# Patient Record
Sex: FEMALE | Race: BLACK OR AFRICAN AMERICAN | NOT HISPANIC OR LATINO | Employment: FULL TIME | URBAN - METROPOLITAN AREA
[De-identification: names, ages, dates, MRNs, and addresses within clinical notes are randomized per-mention and may not be internally consistent; named-entity substitution may affect disease eponyms.]

---

## 2020-03-28 ENCOUNTER — HOSPITAL ENCOUNTER (EMERGENCY)
Facility: HOSPITAL | Age: 50
Discharge: HOME | End: 2020-03-28
Attending: EMERGENCY MEDICINE | Admitting: EMERGENCY MEDICINE
Payer: COMMERCIAL

## 2020-03-28 VITALS
HEART RATE: 100 BPM | TEMPERATURE: 98 F | WEIGHT: 210.1 LBS | SYSTOLIC BLOOD PRESSURE: 184 MMHG | OXYGEN SATURATION: 98 % | RESPIRATION RATE: 18 BRPM | DIASTOLIC BLOOD PRESSURE: 86 MMHG

## 2020-03-28 DIAGNOSIS — J06.9 URI (UPPER RESPIRATORY INFECTION): Primary | ICD-10-CM

## 2020-03-28 PROCEDURE — 99283 EMERGENCY DEPT VISIT LOW MDM: CPT | Performed by: EMERGENCY MEDICINE

## 2020-03-28 PROCEDURE — 99284 EMERGENCY DEPT VISIT MOD MDM: CPT

## 2020-03-28 PROCEDURE — 87635 SARS-COV-2 COVID-19 AMP PRB: CPT | Performed by: EMERGENCY MEDICINE

## 2020-04-02 LAB — SARS-COV-2 RNA SPEC QL NAA+PROBE: NOT DETECTED

## 2020-05-03 ENCOUNTER — APPOINTMENT (EMERGENCY)
Dept: RADIOLOGY | Facility: HOSPITAL | Age: 50
End: 2020-05-03
Payer: COMMERCIAL

## 2020-05-03 ENCOUNTER — HOSPITAL ENCOUNTER (EMERGENCY)
Facility: HOSPITAL | Age: 50
Discharge: HOME | End: 2020-05-03
Attending: EMERGENCY MEDICINE | Admitting: EMERGENCY MEDICINE
Payer: COMMERCIAL

## 2020-05-03 VITALS
SYSTOLIC BLOOD PRESSURE: 182 MMHG | RESPIRATION RATE: 18 BRPM | WEIGHT: 196.87 LBS | HEART RATE: 98 BPM | OXYGEN SATURATION: 95 % | TEMPERATURE: 99.9 F | HEIGHT: 66 IN | DIASTOLIC BLOOD PRESSURE: 89 MMHG | BODY MASS INDEX: 31.64 KG/M2

## 2020-05-03 DIAGNOSIS — J06.9 URI (UPPER RESPIRATORY INFECTION): ICD-10-CM

## 2020-05-03 DIAGNOSIS — U07.1 COVID-19: Primary | ICD-10-CM

## 2020-05-03 LAB — SARS-COV-2 RNA RESP QL NAA+PROBE: POSITIVE

## 2020-05-03 PROCEDURE — 71045 X-RAY EXAM CHEST 1 VIEW: CPT

## 2020-05-03 PROCEDURE — 99283 EMERGENCY DEPT VISIT LOW MDM: CPT | Performed by: EMERGENCY MEDICINE

## 2020-05-03 PROCEDURE — 87635 SARS-COV-2 COVID-19 AMP PRB: CPT | Performed by: EMERGENCY MEDICINE

## 2020-05-03 PROCEDURE — 99283 EMERGENCY DEPT VISIT LOW MDM: CPT

## 2020-05-03 RX ORDER — AZITHROMYCIN 250 MG/1
TABLET, FILM COATED ORAL
Qty: 6 TABLET | Refills: 0 | Status: SHIPPED | OUTPATIENT
Start: 2020-05-03 | End: 2020-05-07

## 2020-09-06 ENCOUNTER — HOSPITAL ENCOUNTER (EMERGENCY)
Facility: HOSPITAL | Age: 50
Discharge: HOME | End: 2020-09-06
Attending: EMERGENCY MEDICINE | Admitting: EMERGENCY MEDICINE
Payer: COMMERCIAL

## 2020-09-06 VITALS
HEIGHT: 70 IN | BODY MASS INDEX: 28.5 KG/M2 | HEART RATE: 71 BPM | DIASTOLIC BLOOD PRESSURE: 71 MMHG | SYSTOLIC BLOOD PRESSURE: 172 MMHG | TEMPERATURE: 97.7 F | RESPIRATION RATE: 18 BRPM | OXYGEN SATURATION: 98 % | WEIGHT: 199.08 LBS

## 2020-09-06 DIAGNOSIS — H40.9 GLAUCOMA: Primary | ICD-10-CM

## 2020-09-06 PROCEDURE — 99283 EMERGENCY DEPT VISIT LOW MDM: CPT

## 2020-09-06 PROCEDURE — 99284 EMERGENCY DEPT VISIT MOD MDM: CPT | Performed by: EMERGENCY MEDICINE

## 2020-09-06 RX ORDER — LATANOPROST 50 UG/ML
1 SOLUTION/ DROPS OPHTHALMIC ONCE
Status: DISCONTINUED | OUTPATIENT
Start: 2020-09-06 | End: 2020-09-06

## 2020-09-06 RX ORDER — ACETAZOLAMIDE 250 MG/1
500 TABLET ORAL ONCE
Status: DISCONTINUED | OUTPATIENT
Start: 2020-09-06 | End: 2020-09-06

## 2020-09-06 RX ORDER — AMLODIPINE BESYLATE 5 MG/1
5 TABLET ORAL DAILY
COMMUNITY
Start: 2020-09-03

## 2020-09-06 RX ORDER — TETRACAINE HYDROCHLORIDE 5 MG/ML
1 SOLUTION OPHTHALMIC ONCE
Status: COMPLETED | OUTPATIENT
Start: 2020-09-06 | End: 2020-09-06

## 2020-09-06 RX ORDER — ALPRAZOLAM 0.25 MG/1
0.25 TABLET ORAL DAILY PRN
COMMUNITY
Start: 2020-09-03 | End: 2021-03-02

## 2020-09-06 RX ORDER — TIMOLOL MALEATE 5 MG/ML
1 SOLUTION/ DROPS OPHTHALMIC 2 TIMES DAILY
Status: DISCONTINUED | OUTPATIENT
Start: 2020-09-06 | End: 2020-09-06

## 2020-09-06 RX ORDER — ALBUTEROL SULFATE 90 UG/1
2 AEROSOL, METERED RESPIRATORY (INHALATION) EVERY 6 HOURS PRN
COMMUNITY
Start: 2020-05-28 | End: 2021-05-28

## 2020-09-06 RX ADMIN — FLUORESCEIN SODIUM 1 STRIP: 1 STRIP OPHTHALMIC at 09:57

## 2020-09-06 RX ADMIN — TETRACAINE HYDROCHLORIDE 1 DROP: 5 SOLUTION OPHTHALMIC at 09:56

## 2020-09-06 NOTE — ED PROVIDER NOTES
History  Chief Complaint   Patient presents with    Eye Problem     Patient notes right-sided eye pain and blurry vision since last Friday  States pain started after increased stress and thought BP was elevated  Went to PMD on Thursday and wa told might have ruptured blood vessels in the back of her eye  Patient notes right eye pain, blurry viison, and "seeing spots "     51 yo female with h/o HTN who presents to the ED for evaluation of approximately 1 week of gradual onset of R eye pain with associated blurry vision  Symptoms constant  Worse with light  Pt notes h/o similar but less severe sxs multiple times in the past with uncertain dx  No fever  No HA  Medical history significant for HTN, was started on amlodipine earlier this week for same  Prior to Admission Medications   Prescriptions Last Dose Informant Patient Reported? Taking? ALPRAZolam (XANAX) 0 25 mg tablet 2020 at 2100  Yes Yes   Sig: Take 0 25 mg by mouth daily as needed   albuterol (PROVENTIL HFA,VENTOLIN HFA) 90 mcg/act inhaler More than a month at Unknown time  Yes No   Sig: Inhale 2 puffs every 6 (six) hours as needed   amLODIPine (NORVASC) 5 mg tablet 2020 at 0900  Yes Yes   Sig: Take 5 mg by mouth daily      Facility-Administered Medications: None       Past Medical History:   Diagnosis Date    Anxiety        Past Surgical History:   Procedure Laterality Date     SECTION      HYSTERECTOMY         History reviewed  No pertinent family history  I have reviewed and agree with the history as documented  E-Cigarette/Vaping    E-Cigarette Use Never User      E-Cigarette/Vaping Substances     Social History     Tobacco Use    Smoking status: Never Smoker    Smokeless tobacco: Never Used   Substance Use Topics    Alcohol use: Never     Frequency: Never    Drug use: Never       Review of Systems   Constitutional: Negative for chills and fever     Eyes: Positive for photophobia, pain, redness and visual disturbance  Negative for discharge and itching  Neurological: Negative for dizziness and headaches  All other systems reviewed and are negative  Physical Exam  Physical Exam  Vitals signs and nursing note reviewed  Constitutional:       General: She is not in acute distress  Appearance: She is well-developed  She is not diaphoretic  HENT:      Head: Normocephalic and atraumatic  Eyes:      General:         Right eye: No discharge  Left eye: No discharge  Comments: OD severe conjunctival injection  Pupil poorly reactive  Due to difficulty with exam and pt being unable to maintain her gaze and keep eyes open it is difficult to tell if there is an APD  There is no proptosis or chemosis  The anterior chamber appears cloudy  OS - normal to inspection  Neck:      Musculoskeletal: Normal range of motion and neck supple  Vascular: No JVD  Pulmonary:      Breath sounds: No stridor  Musculoskeletal: Normal range of motion  General: No tenderness or deformity  Skin:     General: Skin is warm and dry  Capillary Refill: Capillary refill takes less than 2 seconds  Neurological:      Mental Status: She is alert and oriented to person, place, and time  Cranial Nerves: No cranial nerve deficit  Sensory: No sensory deficit  Motor: No abnormal muscle tone        Coordination: Coordination normal          Vital Signs  ED Triage Vitals [09/06/20 0916]   Temperature Pulse Respirations Blood Pressure SpO2   97 7 °F (36 5 °C) 70 18 (!) 185/92 98 %      Temp Source Heart Rate Source Patient Position - Orthostatic VS BP Location FiO2 (%)   Oral Monitor Lying Right arm --      Pain Score       --           Vitals:    09/06/20 0916 09/06/20 0923 09/06/20 0930   BP: (!) 185/92 (!) 208/90 (!) 172/71   Pulse: 70 72 71   Patient Position - Orthostatic VS: Lying Lying Lying         Visual Acuity  Visual Acuity      Most Recent Value   Visual acuity R eye is  20/70 Visual acuity Left eye is  20/50   Wearing corrective eyewear/lenses? No   No corrective eyewear/lenses  Yes   L Pupil Size (mm)  2   R Pupil Size (mm)  2          ED Medications  Medications   fluorescein sodium sterile ophthalmic strip 1 strip (1 strip Both Eyes Given 9/6/20 0957)   tetracaine 0 5 % ophthalmic solution 1 drop (1 drop Both Eyes Given 9/6/20 0956)       Diagnostic Studies  Results Reviewed     None                 No orders to display              Procedures  Procedures         ED Course       US AUDIT      Most Recent Value   Initial Alcohol Screen: US AUDIT-C    1  How often do you have a drink containing alcohol?  0 Filed at: 09/06/2020 0917   2  How many drinks containing alcohol do you have on a typical day you are drinking? 0 Filed at: 09/06/2020 0917   3b  FEMALE Any Age, or MALE 65+: How often do you have 4 or more drinks on one occassion? 0 Filed at: 09/06/2020 0917   Audit-C Score  0 Filed at: 09/06/2020 5543                  TON/DAST-10      Most Recent Value   How many times in the past year have you    Used an illegal drug or used a prescription medication for non-medical reasons? Never Filed at: 09/06/2020 8353                                MDM  Number of Diagnoses or Management Options  Glaucoma:   Diagnosis management comments: R eye erythema with decreased acuity and pain, suspect glaucoma  Discussed case w Dr Cherelle Ghosh who will see pt now in his office  Pt's  will drive her to office  Disposition  Final diagnoses:   Glaucoma     Time reflects when diagnosis was documented in both MDM as applicable and the Disposition within this note     Time User Action Codes Description Comment    9/6/2020 10:31 AM Feng Newell Add [H40 9] Glaucoma       ED Disposition     ED Disposition Condition Date/Time Comment    Discharge Stable Sun Sep 6, 2020 10:31 AM Seth Blackwell discharge to home/self care              Follow-up Information     Follow up With Specialties Details Why Contact Info    Jerri Fernandez MD Ophthalmology  go see him TODAY, he will see you at 12 noon today  [place] [address]  [phone]            Discharge Medication List as of 9/6/2020 10:33 AM      CONTINUE these medications which have NOT CHANGED    Details   ALPRAZolam (XANAX) 0 25 mg tablet Take 0 25 mg by mouth daily as needed, Starting Thu 9/3/2020, Until Tue 3/2/2021, Historical Med      amLODIPine (NORVASC) 5 mg tablet Take 5 mg by mouth daily, Starting Thu 9/3/2020, Historical Med      albuterol (PROVENTIL HFA,VENTOLIN HFA) 90 mcg/act inhaler Inhale 2 puffs every 6 (six) hours as needed, Starting Thu 5/28/2020, Until Fri 5/28/2021, Historical Med           No discharge procedures on file      PDMP Review     None          ED Provider  Electronically Signed by           Margaux Myles MD  09/06/20 7479

## 2020-09-06 NOTE — DISCHARGE INSTRUCTIONS
Go to see Dr Urbina Or at his office at [place] [address] in [place]  The office is on the 2nd floor of the [facility]  Take the elevator to the second floor  The door will be locked unless Dr Urbina Or is already there  If he is not there you should wait for him  If you get lost call him, his cell phone number is [phone] or call me back at [phone] and I will try to help you

## 2021-09-01 ENCOUNTER — HOSPITAL ENCOUNTER (EMERGENCY)
Facility: HOSPITAL | Age: 51
Discharge: HOME | End: 2021-09-02
Attending: EMERGENCY MEDICINE | Admitting: EMERGENCY MEDICINE
Payer: COMMERCIAL

## 2021-09-01 ENCOUNTER — APPOINTMENT (EMERGENCY)
Dept: RADIOLOGY | Facility: HOSPITAL | Age: 51
End: 2021-09-01
Payer: COMMERCIAL

## 2021-09-01 ENCOUNTER — APPOINTMENT (EMERGENCY)
Dept: CT IMAGING | Facility: HOSPITAL | Age: 51
End: 2021-09-01
Payer: COMMERCIAL

## 2021-09-01 DIAGNOSIS — S89.90XA KNEE INJURY: Primary | ICD-10-CM

## 2021-09-01 LAB
ALBUMIN SERPL BCP-MCNC: 3.4 G/DL (ref 3.5–5)
ALP SERPL-CCNC: 86 U/L (ref 46–116)
ALT SERPL W P-5'-P-CCNC: 23 U/L (ref 12–78)
ANION GAP SERPL CALCULATED.3IONS-SCNC: 9 MMOL/L (ref 4–13)
AST SERPL W P-5'-P-CCNC: 14 U/L (ref 5–45)
BILIRUB SERPL-MCNC: 0.42 MG/DL (ref 0.2–1)
BUN SERPL-MCNC: 13 MG/DL (ref 5–25)
CALCIUM ALBUM COR SERPL-MCNC: 9.2 MG/DL (ref 8.3–10.1)
CALCIUM SERPL-MCNC: 8.7 MG/DL (ref 8.3–10.1)
CHLORIDE SERPL-SCNC: 103 MMOL/L (ref 100–108)
CO2 SERPL-SCNC: 30 MMOL/L (ref 21–32)
CREAT SERPL-MCNC: 0.77 MG/DL (ref 0.6–1.3)
GFR SERPL CREATININE-BSD FRML MDRD: 104 ML/MIN/1.73SQ M
GLUCOSE SERPL-MCNC: 101 MG/DL (ref 65–140)
POTASSIUM SERPL-SCNC: 3.5 MMOL/L (ref 3.5–5.3)
PROT SERPL-MCNC: 6.9 G/DL (ref 6.4–8.2)
SODIUM SERPL-SCNC: 142 MMOL/L (ref 136–145)

## 2021-09-01 PROCEDURE — 73706 CT ANGIO LWR EXTR W/O&W/DYE: CPT

## 2021-09-01 PROCEDURE — 73600 X-RAY EXAM OF ANKLE: CPT

## 2021-09-01 PROCEDURE — G1004 CDSM NDSC: HCPCS

## 2021-09-01 PROCEDURE — 36415 COLL VENOUS BLD VENIPUNCTURE: CPT | Performed by: EMERGENCY MEDICINE

## 2021-09-01 PROCEDURE — 96376 TX/PRO/DX INJ SAME DRUG ADON: CPT

## 2021-09-01 PROCEDURE — 96374 THER/PROPH/DIAG INJ IV PUSH: CPT

## 2021-09-01 PROCEDURE — 99284 EMERGENCY DEPT VISIT MOD MDM: CPT

## 2021-09-01 PROCEDURE — 80053 COMPREHEN METABOLIC PANEL: CPT | Performed by: EMERGENCY MEDICINE

## 2021-09-01 PROCEDURE — 73564 X-RAY EXAM KNEE 4 OR MORE: CPT

## 2021-09-01 PROCEDURE — 99284 EMERGENCY DEPT VISIT MOD MDM: CPT | Performed by: EMERGENCY MEDICINE

## 2021-09-01 RX ORDER — KETOROLAC TROMETHAMINE 30 MG/ML
15 INJECTION, SOLUTION INTRAMUSCULAR; INTRAVENOUS ONCE
Status: COMPLETED | OUTPATIENT
Start: 2021-09-01 | End: 2021-09-01

## 2021-09-01 RX ORDER — ACETAMINOPHEN 325 MG/1
975 TABLET ORAL ONCE
Status: COMPLETED | OUTPATIENT
Start: 2021-09-01 | End: 2021-09-01

## 2021-09-01 RX ORDER — OXYCODONE HYDROCHLORIDE 5 MG/1
5 TABLET ORAL ONCE
Status: COMPLETED | OUTPATIENT
Start: 2021-09-01 | End: 2021-09-01

## 2021-09-01 RX ADMIN — OXYCODONE HYDROCHLORIDE 5 MG: 5 TABLET ORAL at 23:57

## 2021-09-01 RX ADMIN — KETOROLAC TROMETHAMINE 15 MG: 30 INJECTION, SOLUTION INTRAMUSCULAR; INTRAVENOUS at 21:04

## 2021-09-01 RX ADMIN — IOHEXOL 190 ML: 350 INJECTION, SOLUTION INTRAVENOUS at 22:10

## 2021-09-01 RX ADMIN — KETOROLAC TROMETHAMINE 15 MG: 30 INJECTION, SOLUTION INTRAMUSCULAR; INTRAVENOUS at 23:43

## 2021-09-01 RX ADMIN — ACETAMINOPHEN 975 MG: 325 TABLET, FILM COATED ORAL at 21:06

## 2021-09-01 NOTE — Clinical Note
Seda Willis was seen and treated in our emergency department on 9/1/2021  Diagnosis:     Edison Felton  may return to work on return date  She may return on this date: 09/06/2021         If you have any questions or concerns, please don't hesitate to call        Saint Snipe, DO    ______________________________           _______________          _______________  Hospital Representative                              Date                                Time

## 2021-09-02 VITALS
RESPIRATION RATE: 20 BRPM | TEMPERATURE: 98.1 F | WEIGHT: 200 LBS | SYSTOLIC BLOOD PRESSURE: 150 MMHG | OXYGEN SATURATION: 99 % | BODY MASS INDEX: 28.7 KG/M2 | HEART RATE: 79 BPM | DIASTOLIC BLOOD PRESSURE: 72 MMHG

## 2021-09-02 NOTE — ED NOTES
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

## 2021-09-02 NOTE — ED PROVIDER NOTES
History  Chief Complaint   Patient presents with    Knee Pain     Patient reports that she stood up on the bus and felt "my bones in my knee move in and out"     Patient is a 41-year-old female past medical history of anxiety and hypertension presenting with left knee pain  Patient states that 4 hours ago while standing on the bus she felt her knee give out, states that her knee went backwards and then went back into place  She states that since that time she has been unable to bear weight notes constant aching/burning pain which radiates up and down her leg and is worse with any attempts to bear weight  Has not taken any pain medication denies any numbness or tingling  Denies any other injuries and states that she fell into a chair and denies head trauma  Prior to Admission Medications   Prescriptions Last Dose Informant Patient Reported? Taking? ALPRAZolam (XANAX) 0 25 mg tablet   Yes No   Sig: Take 0 25 mg by mouth daily as needed   amLODIPine (NORVASC) 5 mg tablet   Yes No   Sig: Take 5 mg by mouth daily      Facility-Administered Medications: None       Past Medical History:   Diagnosis Date    Anxiety        Past Surgical History:   Procedure Laterality Date     SECTION      HYSTERECTOMY         History reviewed  No pertinent family history  I have reviewed and agree with the history as documented  E-Cigarette/Vaping    E-Cigarette Use Never User      E-Cigarette/Vaping Substances     Social History     Tobacco Use    Smoking status: Never Smoker    Smokeless tobacco: Never Used   Vaping Use    Vaping Use: Never used   Substance Use Topics    Alcohol use: Never    Drug use: Never       Review of Systems   All other systems reviewed and are negative  Physical Exam  Physical Exam  Vitals reviewed  Constitutional:       General: She is not in acute distress  Appearance: Normal appearance  She is not ill-appearing     HENT:      Mouth/Throat:      Mouth: Mucous membranes are moist    Eyes:      Conjunctiva/sclera: Conjunctivae normal    Cardiovascular:      Rate and Rhythm: Normal rate  Pulses: Normal pulses  Pulmonary:      Effort: Pulmonary effort is normal    Abdominal:      General: Abdomen is flat  Tenderness: There is no abdominal tenderness  Musculoskeletal:         General: Swelling and tenderness present  Cervical back: Neck supple  Comments: Lateral and posterior tenderness, no patellar tenderness, tenderness to the lateral malleolus with mild edema, intact distal motor, sensation, pulses, mildly reduced range of motion flexion extension the knee secondary to pain   Skin:     General: Skin is warm and dry  Capillary Refill: Capillary refill takes less than 2 seconds  Neurological:      General: No focal deficit present  Mental Status: She is alert  Sensory: No sensory deficit  Motor: Weakness present     Psychiatric:         Mood and Affect: Mood normal          Vital Signs  ED Triage Vitals [09/01/21 2027]   Temperature Pulse Respirations Blood Pressure SpO2   98 1 °F (36 7 °C) 78 16 168/81 98 %      Temp Source Heart Rate Source Patient Position - Orthostatic VS BP Location FiO2 (%)   Oral Monitor -- Right arm --      Pain Score       8           Vitals:    09/01/21 2027   BP: 168/81   Pulse: 78         Visual Acuity      ED Medications  Medications   ketorolac (TORADOL) injection 15 mg (15 mg Intravenous Given 9/1/21 2104)   acetaminophen (TYLENOL) tablet 975 mg (975 mg Oral Given 9/1/21 2106)   iohexol (OMNIPAQUE) 350 MG/ML injection (SINGLE-DOSE) 190 mL (190 mL Intravenous Given 9/1/21 2210)       Diagnostic Studies  Results Reviewed     Procedure Component Value Units Date/Time    Comprehensive metabolic panel [[id number]]  (Abnormal) Collected: 09/01/21 2103    Lab Status: Final result Specimen: Blood from Arm, Left Updated: 09/01/21 2127     Sodium 142 mmol/L      Potassium 3 5 mmol/L      Chloride 103 mmol/L CO2 30 mmol/L      ANION GAP 9 mmol/L      BUN 13 mg/dL      Creatinine 0 77 mg/dL      Glucose 101 mg/dL      Calcium 8 7 mg/dL      Corrected Calcium 9 2 mg/dL      AST 14 U/L      ALT 23 U/L      Alkaline Phosphatase 86 U/L      Total Protein 6 9 g/dL      Albumin 3 4 g/dL      Total Bilirubin 0 42 mg/dL      eGFR 104 ml/min/1 73sq m     Narrative:      [place] guidelines for Chronic Kidney Disease (CKD):     Stage 1 with normal or high GFR (GFR > 90 mL/min/1 73 square meters)    Stage 2 Mild CKD (GFR = 60-89 mL/min/1 73 square meters)    Stage 3A Moderate CKD (GFR = 45-59 mL/min/1 73 square meters)    Stage 3B Moderate CKD (GFR = 30-44 mL/min/1 73 square meters)    Stage 4 Severe CKD (GFR = 15-29 mL/min/1 73 square meters)    Stage 5 End Stage CKD (GFR <15 mL/min/1 73 square meters)  Note: GFR calculation is accurate only with a steady state creatinine                 CTA lower extremity left w wo contrast   Final Result by Noemi Shannon MD (09/01 2312)      Unremarkable CT angiogram of the lower extremities  Workstation performed: [id number]         XR ankle 2 views LEFT   ED Interpretation by Stephane Boateng DO (09/01 2108)   NAD      XR knee 4+ vw left injury   ED Interpretation by Stephane Boateng DO (09/01 2108)   Possible tibial plateau fracture, follow-up CT                 Procedures  Procedures         ED Course  ED Course as of Sep 01 2316   Wed Sep 01, 2021   2315 CT unremarkable, will place in knee immobilizer and crutches and give orthopedic follow-up  MDM  Number of Diagnoses or Management Options  Diagnosis management comments: Patient is a 59-year-old female past medical history of anxiety, hypertension presenting for knee pain  Patient is well-appearing bedside stable vitals and in no acute distress    She is neurovascularly intact and has no patellar tenderness but does have lateral and posterior tenderness  As patient describes dislocation, patellar versus total knee dislocation which has spontaneously reduced will obtain CTA of the lower extremity to rule out popliteal injury, give pain control and if unremarkable placed in knee immobilizer with outpatient follow-up  Disposition  Final diagnoses:   Knee injury     Time reflects when diagnosis was documented in both MDM as applicable and the Disposition within this note     Time User Action Codes Description Comment    9/1/2021 11:16 PM Jewelene Hockey Add [S89 90XA] Knee injury       ED Disposition     ED Disposition Condition Date/Time Comment    Discharge Stable Wed Sep 1, 2021 11:15 PM Aleksandr Gomez discharge to home/self care  Follow-up Information     Follow up With Specialties Details Why Contact Info Additional 0109 Located within [facility] Orthopedic Surgery Schedule an appointment as soon as possible for a visit   [address] [facility] [place]  [address]  [address] [place] [address], [address], [place], [place], [address] [phone]          Patient's Medications   Discharge Prescriptions    No medications on file     No discharge procedures on file      PDMP Review     None          ED Provider  Electronically Signed by           Nancy Young DO  09/01/21 2915

## 2021-09-21 DIAGNOSIS — M25.562 ACUTE PAIN OF LEFT KNEE: Primary | ICD-10-CM

## 2021-09-24 ENCOUNTER — APPOINTMENT (OUTPATIENT)
Dept: RADIOLOGY | Facility: CLINIC | Age: 51
End: 2021-09-24
Payer: COMMERCIAL

## 2021-09-24 ENCOUNTER — OFFICE VISIT (OUTPATIENT)
Dept: OBGYN CLINIC | Facility: CLINIC | Age: 51
End: 2021-09-24
Payer: COMMERCIAL

## 2021-09-24 VITALS
HEIGHT: 70 IN | BODY MASS INDEX: 28.63 KG/M2 | DIASTOLIC BLOOD PRESSURE: 80 MMHG | WEIGHT: 200 LBS | HEART RATE: 91 BPM | SYSTOLIC BLOOD PRESSURE: 142 MMHG

## 2021-09-24 DIAGNOSIS — S83.242D OTHER TEAR OF MEDIAL MENISCUS OF LEFT KNEE AS CURRENT INJURY, SUBSEQUENT ENCOUNTER: Primary | ICD-10-CM

## 2021-09-24 DIAGNOSIS — M25.562 ACUTE PAIN OF LEFT KNEE: ICD-10-CM

## 2021-09-24 PROCEDURE — 73560 X-RAY EXAM OF KNEE 1 OR 2: CPT

## 2021-09-24 PROCEDURE — 99203 OFFICE O/P NEW LOW 30 MIN: CPT | Performed by: ORTHOPAEDIC SURGERY

## 2021-09-24 NOTE — LETTER
September 24, 2021     Patient: Beronica Abraham   YOB: 1970   Date of Visit: 9/24/2021       To Whom it May Concern:    Beronica Abraham is under my professional care  She was seen in my office on 9/24/2021  She can only work from home for next 4 weeks  If you have any questions or concerns, please don't hesitate to call           Sincerely,          Rishabh Vivas MD        CC: Beronica Abraham

## 2021-09-24 NOTE — LETTER
September 24, 2021     Patient: Tawana Vargas   YOB: 1970   Date of Visit: 9/24/2021       To Whom it May Concern:    Tawana Vargas is under my professional care  She was seen in my office on 9/24/2021  She had to work from home on the week of 9/20/2021  If you have any questions or concerns, please don't hesitate to call           Sincerely,          Leonora Funez MD        CC: Tawana Vargas

## 2021-09-24 NOTE — PROGRESS NOTES
Orthopaedics Office Visit - New Patient Visit    ASSESSMENT/PLAN:    Assessment:   Left Knee Medial Meniscus Tear    Plan:   -X-ray and CT Scan were reviewed with the patient   -Non-operative treatments were discussed with the patient in the forms of a corticosteroid injection, physical therapy and a hinge knee brace  -Patient was agreeable to physical therapy and a hinge knee brace  -A script for physical therapy was provided to the patient at today's visit  -She was fitted for a hinged knee brace at today's visit  -Work note provided to patient to work from home for the next 4 weeks    To Do Next Visit:  Follow-up in 4 weeks and consider a MRI of her left knee if no improvements are seen in physical therapy     _____________________________________________________  CHIEF COMPLAINT:  Chief Complaint   Patient presents with    Left Knee - Pain         SUBJECTIVE:  Cecil Abad is a 46 y o  female who presents who presents to the office today for an initial evaluation of her left knee  She states that on 9/1/2021,  She was on a bus coming back from [place] when she went to get up and felt her knee give out  She states that her knee went backwards and then went back into place  She states that she was unable to bear weight after the initial incident  She was seen in the ED immediately after the injury where x-rays were taken and a CT scan performed  She was given a knee immobilizer and crutches  She states that she will experience daily constant pain which fluctuates between dull and achy and sharp and shooting  She states that her pain is exacerbated with prolonged periods of walking and ambulating up and down stairs  She notes episodes of instability  She states that she tore her medial meniscus years ago and never had it repaired  She has been using ibuprofen to help alleviate her pain  She denies any numbness or tingling      PAST MEDICAL HISTORY:  Past Medical History:   Diagnosis Date    Anxiety        PAST SURGICAL HISTORY:  Past Surgical History:   Procedure Laterality Date     SECTION      HYSTERECTOMY         FAMILY HISTORY:  History reviewed  No pertinent family history  SOCIAL HISTORY:  Social History     Tobacco Use    Smoking status: Never Smoker    Smokeless tobacco: Never Used   Vaping Use    Vaping Use: Never used   Substance Use Topics    Alcohol use: Never    Drug use: Never       MEDICATIONS:    Current Outpatient Medications:     amLODIPine (NORVASC) 5 mg tablet, Take 5 mg by mouth daily, Disp: , Rfl:     ALPRAZolam (XANAX) 0 25 mg tablet, Take 0 25 mg by mouth daily as needed, Disp: , Rfl:     ALLERGIES:  Allergies   Allergen Reactions    Shellfish-Derived Products - Food Allergy        REVIEW OF SYSTEMS:  MSK: Left Knee pain  Neuro: Intact  Pertinent items are otherwise noted in HPI  A comprehensive review of systems was otherwise negative  LABS:  HgA1c: No results found for: HGBA1C  BMP:   Lab Results   Component Value Date    CALCIUM 8 7 2021    K 3 5 2021    CO2 30 2021     2021    BUN 13 2021    CREATININE 0 77 2021     CBC: No components found for: CBC    _____________________________________________________  PHYSICAL EXAMINATION:  Vital signs: /80   Pulse 91   Ht 5' 10" (1 778 m)   Wt 90 7 kg (200 lb)   BMI 28 70 kg/m²   General: No acute distress, awake and alert  Psychiatric: Mood and affect appear appropriate  HEENT: Trachea Midline, No torticollis, no apparent facial trauma  Cardiovascular: No audible murmurs;  Extremities appear perfused  Pulmonary: No audible wheezing or stridor  Skin: No open lesions; see further details (if any) below    MUSCULOSKELETAL EXAMINATION:  Extremities: Left Knee  ROM 0-110  TTP medial joint line  Crepitus appreciated on exam  Negative valgus  Negative varus  Mild Swelling  Neurovascularly intact    _____________________________________________________  STUDIES REVIEWED:  I personally reviewed the images and interpretation is as follows:  X-rays performed in the office today of her left knee demonstrates no acute fractures, dislocations, lytic or blastic lesions  CT scan from 09/01/2021 of her right lower extremity is unremarkable        PROCEDURES PERFORMED:  Procedures    Scribe Attestation    I,:  Jaden Gillis am acting as a scribe while in the presence of the attending physician :       I,:  Benny Locke MD personally performed the services described in this documentation    as scribed in my presence :

## 2021-09-26 PROBLEM — S83.242A TEAR OF MEDIAL MENISCUS OF LEFT KNEE, CURRENT: Status: ACTIVE | Noted: 2021-09-26

## 2021-10-12 ENCOUNTER — TELEPHONE (OUTPATIENT)
Dept: OBGYN CLINIC | Facility: HOSPITAL | Age: 51
End: 2021-10-12

## 2021-10-13 ENCOUNTER — TELEPHONE (OUTPATIENT)
Dept: OBGYN CLINIC | Facility: HOSPITAL | Age: 51
End: 2021-10-13

## 2021-10-14 ENCOUNTER — TELEPHONE (OUTPATIENT)
Dept: OBGYN CLINIC | Facility: OTHER | Age: 51
End: 2021-10-14

## 2021-11-05 ENCOUNTER — TELEPHONE (OUTPATIENT)
Dept: OBGYN CLINIC | Facility: HOSPITAL | Age: 51
End: 2021-11-05

## 2021-11-16 ENCOUNTER — TELEPHONE (OUTPATIENT)
Dept: OBGYN CLINIC | Facility: HOSPITAL | Age: 51
End: 2021-11-16

## 2021-12-07 ENCOUNTER — OFFICE VISIT (OUTPATIENT)
Dept: OBGYN CLINIC | Facility: CLINIC | Age: 51
End: 2021-12-07
Payer: COMMERCIAL

## 2021-12-07 VITALS
DIASTOLIC BLOOD PRESSURE: 90 MMHG | WEIGHT: 210.4 LBS | BODY MASS INDEX: 30.12 KG/M2 | HEART RATE: 87 BPM | HEIGHT: 70 IN | SYSTOLIC BLOOD PRESSURE: 157 MMHG

## 2021-12-07 DIAGNOSIS — S83.242D OTHER TEAR OF MEDIAL MENISCUS OF LEFT KNEE AS CURRENT INJURY, SUBSEQUENT ENCOUNTER: Primary | ICD-10-CM

## 2021-12-07 PROCEDURE — 99213 OFFICE O/P EST LOW 20 MIN: CPT | Performed by: ORTHOPAEDIC SURGERY

## 2022-01-04 ENCOUNTER — OFFICE VISIT (OUTPATIENT)
Dept: OBGYN CLINIC | Facility: CLINIC | Age: 52
End: 2022-01-04
Payer: COMMERCIAL

## 2022-01-04 VITALS
DIASTOLIC BLOOD PRESSURE: 97 MMHG | HEIGHT: 70 IN | HEART RATE: 69 BPM | SYSTOLIC BLOOD PRESSURE: 154 MMHG | WEIGHT: 210 LBS | BODY MASS INDEX: 30.06 KG/M2

## 2022-01-04 DIAGNOSIS — S83.242D OTHER TEAR OF MEDIAL MENISCUS OF LEFT KNEE AS CURRENT INJURY, SUBSEQUENT ENCOUNTER: Primary | ICD-10-CM

## 2022-01-04 PROCEDURE — 99213 OFFICE O/P EST LOW 20 MIN: CPT | Performed by: ORTHOPAEDIC SURGERY

## 2022-01-04 NOTE — LETTER
January 4, 2022     Patient: Radha Dennis   YOB: 1970   Date of Visit: 1/4/2022       To Whom it May Concern:    Radha Dennis is under my professional care  She was seen in my office on 1/4/2022  She can return to work without restrictions on 1/10/2022  If you have any questions or concerns, please don't hesitate to call           Sincerely,          Wolf Cardozo MD        CC: No Recipients

## 2022-01-04 NOTE — PROGRESS NOTES
Orthopaedics Office Visit - Follow up Patient Visit    ASSESSMENT/PLAN:    Assessment:   Left knee medial meniscus tear    DOI 21  Improvement with therapy, still notes clicking       Plan:   - Weight bear as tolerated left lower extremity   - Offered patient cortisone injection  Patient declined at this time  - Continue physical therapy   - Over the counter analgesics as needed / directed   - Ice / heat as directed   - Patient will return to work without restrictions on 1/10/2022  - Follow up as needed      To Do Next Visit:  Evaluate left knee pain  Possible order for MRI if symptoms get worse    _____________________________________________________  CHIEF COMPLAINT:  Chief Complaint   Patient presents with    Left Knee - Follow-up         SUBJECTIVE:  Marlena Gan is a 46 y o  female who presents  to the office for follow-up evaluation of her her left knee  Patient states that her symptoms are improved overall  Patient states that the pain is localized to the knee does not extend into the leg  Patient denies any numbness or tingling leg  Patient offers no other complaints at this time  PAST MEDICAL HISTORY:  Past Medical History:   Diagnosis Date    Anxiety        PAST SURGICAL HISTORY:  Past Surgical History:   Procedure Laterality Date     SECTION      HYSTERECTOMY         FAMILY HISTORY:  History reviewed  No pertinent family history      SOCIAL HISTORY:  Social History     Tobacco Use    Smoking status: Never Smoker    Smokeless tobacco: Never Used   Vaping Use    Vaping Use: Never used   Substance Use Topics    Alcohol use: Never    Drug use: Never       MEDICATIONS:    Current Outpatient Medications:     ALPRAZolam (XANAX) 0 25 mg tablet, Take 0 25 mg by mouth daily as needed, Disp: , Rfl:     amLODIPine (NORVASC) 5 mg tablet, Take 5 mg by mouth daily, Disp: , Rfl:     ALLERGIES:  Allergies   Allergen Reactions    Shellfish-Derived Products - Food Allergy REVIEW OF SYSTEMS:  MSK: left knee pain   Neuro: WNL   Pertinent items are otherwise noted in HPI  A comprehensive review of systems was otherwise negative  LABS:  HgA1c:   Lab Results   Component Value Date    HGBA1C 5 9 (H) 11/16/2021     BMP:   Lab Results   Component Value Date    CALCIUM 8 7 09/01/2021    K 3 5 09/01/2021    CO2 30 09/01/2021     09/01/2021    BUN 13 09/01/2021    CREATININE 0 77 09/01/2021     CBC: No components found for: CBC    _____________________________________________________  PHYSICAL EXAMINATION:  Vital signs: /97   Pulse 69   Ht 5' 10" (1 778 m)   Wt 95 3 kg (210 lb)   BMI 30 13 kg/m²   General: No acute distress, awake and alert  Psychiatric: Mood and affect appear appropriate  HEENT: Trachea Midline, No torticollis, no apparent facial trauma  Cardiovascular: No audible murmurs; Extremities appear perfused  Pulmonary: No audible wheezing or stridor  Skin: No open lesions; see further details (if any) below      MUSCULOSKELETAL EXAMINATION:  Left knee examination:  - Patient sitting comfortably in the office in no acute distress   - no acute visible abnormalities present in left knee  Extremity appears well-perfused overall   - tenderness palpation noted over the medial joint line but no other bony or soft tissue tenderness to palpation noted at this time  - 0-110 degrees range of motion noted with pain  - Special Tests       - positive medial Dayana's examination  Ligamentously stable  - NV intact    _____________________________________________________  STUDIES REVIEWED:  I personally reviewed the images and interpretation is as follows:  N/A       PROCEDURES PERFORMED:  No procedures were performed at this time  Anika Saunders MD                    Portions of the record may have been created with voice recognition software    Occasional wrong word or "sound a like" substitutions may have occurred due to the inherent limitations of voice recognition software  Read the chart carefully and recognize, using context, where substitutions have occurred

## 2022-01-19 ENCOUNTER — TELEPHONE (OUTPATIENT)
Dept: OBGYN CLINIC | Facility: HOSPITAL | Age: 52
End: 2022-01-19

## 2022-01-19 NOTE — TELEPHONE ENCOUNTER
Patient states disablity paperwork was rejected what was sent back  Patient will have them call with more detail

## 2022-06-03 ENCOUNTER — APPOINTMENT (EMERGENCY)
Dept: RADIOLOGY | Facility: HOSPITAL | Age: 52
End: 2022-06-03
Payer: COMMERCIAL

## 2022-06-03 ENCOUNTER — HOSPITAL ENCOUNTER (EMERGENCY)
Facility: HOSPITAL | Age: 52
Discharge: HOME | End: 2022-06-03
Attending: EMERGENCY MEDICINE | Admitting: EMERGENCY MEDICINE
Payer: COMMERCIAL

## 2022-06-03 VITALS
RESPIRATION RATE: 20 BRPM | OXYGEN SATURATION: 97 % | HEART RATE: 77 BPM | TEMPERATURE: 98.4 F | DIASTOLIC BLOOD PRESSURE: 89 MMHG | SYSTOLIC BLOOD PRESSURE: 179 MMHG

## 2022-06-03 DIAGNOSIS — S93.402A LEFT ANKLE SPRAIN: Primary | ICD-10-CM

## 2022-06-03 DIAGNOSIS — R03.0 ELEVATED BLOOD PRESSURE READING: ICD-10-CM

## 2022-06-03 PROCEDURE — 73564 X-RAY EXAM KNEE 4 OR MORE: CPT

## 2022-06-03 PROCEDURE — 99284 EMERGENCY DEPT VISIT MOD MDM: CPT | Performed by: EMERGENCY MEDICINE

## 2022-06-03 PROCEDURE — 73610 X-RAY EXAM OF ANKLE: CPT

## 2022-06-03 PROCEDURE — 99283 EMERGENCY DEPT VISIT LOW MDM: CPT

## 2022-06-03 NOTE — DISCHARGE INSTRUCTIONS
Wear the brace when walking around to help support your ankle  Apprised to help with pain and swelling  Keep your leg elevated at rest   Take ibuprofen (Motrin, Advil) are acetaminophen (Tylenol) as needed for pain, as per the instructions  Do activities as you are able to tolerate  However the primary care doctor to make sure you are doing better  If you are not having significant improvement in about a week, follow-up with orthopedics for further evaluation  Your blood pressure was elevated follow urine emergency department  Monitor at home, and if it stays elevated, your doctor may need to adjust your medications

## 2022-06-03 NOTE — ED PROVIDER NOTES
History  Chief Complaint   Patient presents with    Ankle Injury     Tripped and rolled L ankle 3 days ago from standing, denies hitting head, LOC, bt  Can no longer bear weight, so called EMS  Some swelling noted to site, +CMS, ROM     HPI    Prior to Admission Medications   Prescriptions Last Dose Informant Patient Reported? Taking? ALPRAZolam (XANAX) 0 25 mg tablet   Yes No   Sig: Take 0 25 mg by mouth daily as needed   amLODIPine (NORVASC) 5 mg tablet   Yes No   Sig: Take 5 mg by mouth daily      Facility-Administered Medications: None       Past Medical History:   Diagnosis Date    Anxiety        Past Surgical History:   Procedure Laterality Date     SECTION      HYSTERECTOMY         History reviewed  No pertinent family history  I have reviewed and agree with the history as documented  E-Cigarette/Vaping    E-Cigarette Use Never User      E-Cigarette/Vaping Substances    Nicotine No     THC No     CBD No     Flavoring No     Other No     Unknown No      Social History     Tobacco Use    Smoking status: Never Smoker    Smokeless tobacco: Never Used   Vaping Use    Vaping Use: Never used   Substance Use Topics    Alcohol use: Never    Drug use: Never       Review of Systems    Physical Exam  Physical Exam  Vitals and nursing note reviewed  Constitutional:       General: She is not in acute distress  Appearance: She is well-developed  HENT:      Head: Normocephalic and atraumatic  Eyes:      Conjunctiva/sclera: Conjunctivae normal       Pupils: Pupils are equal, round, and reactive to light  Neck:      Trachea: No tracheal deviation  Cardiovascular:      Rate and Rhythm: Normal rate and regular rhythm  Pulses:           Dorsalis pedis pulses are 2+ on the left side  Posterior tibial pulses are 2+ on the left side  Pulmonary:      Effort: Pulmonary effort is normal  No respiratory distress  Musculoskeletal:      Cervical back: Normal range of motion  Left knee: Bony tenderness (fibular head) present  No swelling  Normal range of motion  Left lower leg: No tenderness  Left ankle: No swelling or deformity  Tenderness (mild, diffuse, no focal bony tenderness) present  Decreased range of motion (mild, due to pain)  Left Achilles Tendon: No tenderness or defects  Low's test negative  Left foot: Normal capillary refill  No swelling or tenderness  Feet:    Skin:     General: Skin is warm and dry  Neurological:      Mental Status: She is alert and oriented to person, place, and time  GCS: GCS eye subscore is 4  GCS verbal subscore is 5  GCS motor subscore is 6  Psychiatric:         Behavior: Behavior normal          Vital Signs  ED Triage Vitals [06/03/22 0923]   Temperature Pulse Respirations Blood Pressure SpO2   98 4 °F (36 9 °C) 77 20 (!) 179/89 97 %      Temp Source Heart Rate Source Patient Position - Orthostatic VS BP Location FiO2 (%)   Oral Monitor Lying Right arm --      Pain Score       --           Vitals:    06/03/22 0923   BP: (!) 179/89   Pulse: 77   Patient Position - Orthostatic VS: Lying         Visual Acuity      ED Medications  Medications - No data to display    Diagnostic Studies  Results Reviewed     None                 XR ankle 3+ views LEFT    (Results Pending)   XR knee 4+ views left injury    (Results Pending)              Procedures  Procedures         ED Course                                             MDM  Number of Diagnoses or Management Options  Elevated blood pressure reading: new and does not require workup  Left ankle sprain: new and requires workup  Diagnosis management comments: This is a 80-year-old female presents here today with left ankle injury  She says three days ago [place] and felt like she twisted her ankle  She did not fall  She had mild discomfort in her ankle initially which seemed to be doing better    Yesterday she was walking around at work she was having more pain in the ankle but says it was still mostly uncomfortable  She says this morning she was unable to bear weight, which prompted her to come for evaluation  She has not taken anything for pain as prior to today it has not been causing significant pain  She says it is fine when she is at rest but is worse when she tries to put weight on it  She denies actually falling  She denies other injuries from the event  She thinks she broke one of her ankles before but is not sure which one  Review of systems:  Otherwise negative unless stated above    She is well appearing, in no acute distress  She has mild diffuse tenderness to the ankle but no focal bony tenderness  She has minimally decreased range of motion due to pain  She is neurovascularly intact distally  She has mild tenderness to the proximal fibula  Exam is otherwise unremarkable  We will get X-rays to evaluate for underlying bony injury  She declines need for any pain medications  X-rays reviewed by myself, include no acute abnormalities  I discussed with her findings, treatment at home, and follow-up, and she expresses understanding  Of note, her blood pressure was elevated here  She says it has been fine when she has been checking at home  She did take her medications this morning  I advised her it is likely because she is in the emergency department, but to continue to monitor at home and follow up with her doctor if it remains persistently elevated         Amount and/or Complexity of Data Reviewed  Tests in the radiology section of CPT®: ordered and reviewed  Independent visualization of images, tracings, or specimens: yes        Disposition  Final diagnoses:   Left ankle sprain   Elevated blood pressure reading     Time reflects when diagnosis was documented in both MDM as applicable and the Disposition within this note     Time User Action Codes Description Comment    6/3/2022 10:53 AM Swathi Samia Stacy Add [K29 489N] Left ankle sprain     6/3/2022 10:57 AM Samia Echevarria Add [R03 0] Elevated blood pressure reading       ED Disposition     ED Disposition   Discharge    Condition   Stable    Date/Time   Fri Connor 3, 2022 10:53 AM    Comment   Florin Moffett discharge to home/self care  Follow-up Information     Follow up With Specialties Details Why Contact Info Additional Information    Joseph Llamas, SJEAL Physician Assistant In 3 days As needed, to follow up on your ankle [address] [facility]  [address] [address]  R Julissa Shin [address] Orthopedic Surgery Schedule an appointment as soon as possible for a visit in 1 week As needed, if no improvement of pain [address] [facility] [address], [address], [place], [place], [address] [phone]          Patient's Medications   Discharge Prescriptions    No medications on file       No discharge procedures on file      PDMP Review     None          ED Provider  Electronically Signed by           Mary Rapp MD  06/03/22 9251

## 2023-12-19 ENCOUNTER — HOSPITAL ENCOUNTER (EMERGENCY)
Facility: HOSPITAL | Age: 53
Discharge: HOME | End: 2023-12-19
Attending: EMERGENCY MEDICINE
Payer: COMMERCIAL

## 2023-12-19 VITALS
DIASTOLIC BLOOD PRESSURE: 95 MMHG | TEMPERATURE: 97.5 F | HEART RATE: 94 BPM | SYSTOLIC BLOOD PRESSURE: 146 MMHG | RESPIRATION RATE: 18 BRPM | OXYGEN SATURATION: 97 %

## 2023-12-19 DIAGNOSIS — F19.10 DRUG ABUSE (HCC): Primary | ICD-10-CM

## 2023-12-19 LAB
2HR DELTA HS TROPONIN: -1 NG/L
ALBUMIN SERPL BCP-MCNC: 4.2 G/DL (ref 3.5–5)
ALP SERPL-CCNC: 91 U/L (ref 34–104)
ALT SERPL W P-5'-P-CCNC: 14 U/L (ref 7–52)
ANION GAP SERPL CALCULATED.3IONS-SCNC: 7 MMOL/L
APAP SERPL-MCNC: <2 UG/ML (ref 10–20)
AST SERPL W P-5'-P-CCNC: 19 U/L (ref 13–39)
ATRIAL RATE: 88 BPM
BASOPHILS # BLD AUTO: 0.04 THOUSANDS/ÂΜL (ref 0–0.1)
BASOPHILS NFR BLD AUTO: 0 % (ref 0–1)
BILIRUB SERPL-MCNC: 0.62 MG/DL (ref 0.2–1)
BUN SERPL-MCNC: 17 MG/DL (ref 5–25)
CALCIUM SERPL-MCNC: 9.2 MG/DL (ref 8.4–10.2)
CARDIAC TROPONIN I PNL SERPL HS: 2 NG/L
CARDIAC TROPONIN I PNL SERPL HS: 3 NG/L
CHLORIDE SERPL-SCNC: 104 MMOL/L (ref 96–108)
CO2 SERPL-SCNC: 26 MMOL/L (ref 21–32)
CREAT SERPL-MCNC: 0.78 MG/DL (ref 0.6–1.3)
EOSINOPHIL # BLD AUTO: 0.07 THOUSAND/ÂΜL (ref 0–0.61)
EOSINOPHIL NFR BLD AUTO: 1 % (ref 0–6)
ERYTHROCYTE [DISTWIDTH] IN BLOOD BY AUTOMATED COUNT: 12.1 % (ref 11.6–15.1)
ETHANOL SERPL-MCNC: <10 MG/DL
GFR SERPL CREATININE-BSD FRML MDRD: 87 ML/MIN/1.73SQ M
GLUCOSE SERPL-MCNC: 145 MG/DL (ref 65–140)
HCT VFR BLD AUTO: 38.3 % (ref 34.8–46.1)
HGB BLD-MCNC: 12.8 G/DL (ref 11.5–15.4)
IMM GRANULOCYTES # BLD AUTO: 0.05 THOUSAND/UL (ref 0–0.2)
IMM GRANULOCYTES NFR BLD AUTO: 1 % (ref 0–2)
LYMPHOCYTES # BLD AUTO: 2.04 THOUSANDS/ÂΜL (ref 0.6–4.47)
LYMPHOCYTES NFR BLD AUTO: 19 % (ref 14–44)
MCH RBC QN AUTO: 30.6 PG (ref 26.8–34.3)
MCHC RBC AUTO-ENTMCNC: 33.4 G/DL (ref 31.4–37.4)
MCV RBC AUTO: 92 FL (ref 82–98)
MONOCYTES # BLD AUTO: 0.56 THOUSAND/ÂΜL (ref 0.17–1.22)
MONOCYTES NFR BLD AUTO: 5 % (ref 4–12)
NEUTROPHILS # BLD AUTO: 7.99 THOUSANDS/ÂΜL (ref 1.85–7.62)
NEUTS SEG NFR BLD AUTO: 74 % (ref 43–75)
NRBC BLD AUTO-RTO: 0 /100 WBCS
P AXIS: 59 DEGREES
PLATELET # BLD AUTO: 147 THOUSANDS/UL (ref 149–390)
PMV BLD AUTO: 13.4 FL (ref 8.9–12.7)
POTASSIUM SERPL-SCNC: 3.9 MMOL/L (ref 3.5–5.3)
PR INTERVAL: 160 MS
PROT SERPL-MCNC: 6.9 G/DL (ref 6.4–8.4)
QRS AXIS: 43 DEGREES
QRSD INTERVAL: 80 MS
QT INTERVAL: 398 MS
QTC INTERVAL: 481 MS
RBC # BLD AUTO: 4.18 MILLION/UL (ref 3.81–5.12)
SALICYLATES SERPL-MCNC: <5 MG/DL (ref 3–20)
SODIUM SERPL-SCNC: 137 MMOL/L (ref 135–147)
T WAVE AXIS: 70 DEGREES
VENTRICULAR RATE: 88 BPM
WBC # BLD AUTO: 10.75 THOUSAND/UL (ref 4.31–10.16)

## 2023-12-19 PROCEDURE — 80179 DRUG ASSAY SALICYLATE: CPT | Performed by: EMERGENCY MEDICINE

## 2023-12-19 PROCEDURE — 99283 EMERGENCY DEPT VISIT LOW MDM: CPT

## 2023-12-19 PROCEDURE — 36415 COLL VENOUS BLD VENIPUNCTURE: CPT | Performed by: EMERGENCY MEDICINE

## 2023-12-19 PROCEDURE — 99284 EMERGENCY DEPT VISIT MOD MDM: CPT | Performed by: EMERGENCY MEDICINE

## 2023-12-19 PROCEDURE — 93005 ELECTROCARDIOGRAM TRACING: CPT

## 2023-12-19 PROCEDURE — 96360 HYDRATION IV INFUSION INIT: CPT

## 2023-12-19 PROCEDURE — 80143 DRUG ASSAY ACETAMINOPHEN: CPT | Performed by: EMERGENCY MEDICINE

## 2023-12-19 PROCEDURE — 96361 HYDRATE IV INFUSION ADD-ON: CPT

## 2023-12-19 PROCEDURE — 85025 COMPLETE CBC W/AUTO DIFF WBC: CPT | Performed by: EMERGENCY MEDICINE

## 2023-12-19 PROCEDURE — 84484 ASSAY OF TROPONIN QUANT: CPT | Performed by: EMERGENCY MEDICINE

## 2023-12-19 PROCEDURE — 82077 ASSAY SPEC XCP UR&BREATH IA: CPT | Performed by: EMERGENCY MEDICINE

## 2023-12-19 PROCEDURE — 80053 COMPREHEN METABOLIC PANEL: CPT | Performed by: EMERGENCY MEDICINE

## 2023-12-19 RX ORDER — HYDROXYZINE HYDROCHLORIDE 25 MG/1
25 TABLET, FILM COATED ORAL ONCE
Status: COMPLETED | OUTPATIENT
Start: 2023-12-19 | End: 2023-12-19

## 2023-12-19 RX ADMIN — HYDROXYZINE HYDROCHLORIDE 25 MG: 25 TABLET, FILM COATED ORAL at 02:01

## 2023-12-19 RX ADMIN — SODIUM CHLORIDE 1000 ML: 0.9 INJECTION, SOLUTION INTRAVENOUS at 02:01

## 2023-12-19 NOTE — ED PROVIDER NOTES
"History  Chief Complaint   Patient presents with    Drug Problem     Pt states she took a 100mg thc candy at 2200 for the first time to help her sleep and now has neck pain and anxiety     53-year-old female presents after taking a delta 9 gummy that her  had after she states \"I've been up and I have been tired.\"  Patient states \"I thought the gummy would make me go to sleep.\"    Patient states \"I feel heavy\" and states \"I'm shaking,\" referencing her left arm.  Patient's only other complaint is \"I'm thirsty.\"    Patient states she ate a gummy around 2200 hrs. and has not used this previously.    Patient denies any intent to harm herself.    Impression and plan: Multiple symptoms likely secondary to her use of THC derivative.  Considering patient's reported \"heavy\" sensation that she states is in her chest and her neck, will obtain cardiac evaluation and toxicologic evaluation.  Will treat patient symptomatically with fluids and hydroxyzine.  Fortunately patient has a nonfocal neurologic exam.  Patient has a tremor to the upper extremities when attempting to extend her arms.  There is no clonus or rigidity that would be concerning for serotonin syndrome or NMS.  Patient is afebrile.  Will monitor and reassess patient regarding continued treatment and disposition.        Prior to Admission Medications   Prescriptions Last Dose Informant Patient Reported? Taking?   ALPRAZolam (XANAX) 0.25 mg tablet   Yes No   Sig: Take 0.25 mg by mouth daily as needed   amLODIPine (NORVASC) 5 mg tablet   Yes No   Sig: Take 5 mg by mouth daily      Facility-Administered Medications: None       Past Medical History:   Diagnosis Date    Anxiety        Past Surgical History:   Procedure Laterality Date     SECTION      HYSTERECTOMY         History reviewed. No pertinent family history.  I have reviewed and agree with the history as documented.    E-Cigarette/Vaping    E-Cigarette Use Never User      E-Cigarette/Vaping " Substances    Nicotine No     THC No     CBD No     Flavoring No     Other No     Unknown No      Social History     Tobacco Use    Smoking status: Never    Smokeless tobacco: Never   Vaping Use    Vaping status: Never Used   Substance Use Topics    Alcohol use: Never    Drug use: Never       Review of Systems    Physical Exam  Physical Exam  Vitals reviewed.   HENT:      Head: Atraumatic.   Eyes:      Pupils: Pupils are equal, round, and reactive to light.   Cardiovascular:      Rate and Rhythm: Normal rate and regular rhythm.   Pulmonary:      Effort: Pulmonary effort is normal.      Breath sounds: Normal breath sounds.   Abdominal:      General: There is no distension.      Palpations: Abdomen is soft.      Tenderness: There is no abdominal tenderness. There is no guarding or rebound.   Musculoskeletal:         General: No deformity.      Cervical back: Normal range of motion and neck supple. No rigidity or tenderness.   Skin:     General: Skin is warm and dry.   Neurological:      General: No focal deficit present.      Mental Status: She is alert and oriented to person, place, and time.      Cranial Nerves: No cranial nerve deficit.      Sensory: No sensory deficit.      Motor: Tremor present. No weakness, abnormal muscle tone or seizure activity.      Deep Tendon Reflexes: Reflexes normal.      Comments: No clonus or rigidity.   Psychiatric:         Mood and Affect: Mood normal.         Speech: Speech is rapid and pressured.         Behavior: Behavior is hyperactive. Behavior is cooperative.         Thought Content: Thought content does not include homicidal or suicidal ideation. Thought content does not include homicidal or suicidal plan.         Vital Signs  ED Triage Vitals [12/19/23 0124]   Temperature Pulse Respirations Blood Pressure SpO2   97.5 °F (36.4 °C) 90 18 (!) 185/104 97 %      Temp Source Heart Rate Source Patient Position - Orthostatic VS BP Location FiO2 (%)   Oral Monitor Lying Right arm --       Pain Score       --           Vitals:    12/19/23 0124 12/19/23 0300 12/19/23 0401   BP: (!) 185/104 155/74 146/95   Pulse: 90 79 94   Patient Position - Orthostatic VS: Lying           Visual Acuity      ED Medications  Medications   sodium chloride 0.9 % bolus 1,000 mL (0 mL Intravenous Stopped 12/19/23 0404)   hydrOXYzine HCL (ATARAX) tablet 25 mg (25 mg Oral Given 12/19/23 0201)       Diagnostic Studies  Results Reviewed       Procedure Component Value Units Date/Time    HS Troponin I 2hr [[id number]]  (Normal) Collected: 12/19/23 0403    Lab Status: Final result Specimen: Blood from Arm, Left Updated: 12/19/23 0435     hs TnI 2hr 2 ng/L      Delta 2hr hsTnI -1 ng/L     HS Troponin I 4hr [[id number]]     Lab Status: No result Specimen: Blood     CBC and differential [[id number]]  (Abnormal) Collected: 12/19/23 0227    Lab Status: Final result Specimen: Blood from Arm, Left Updated: 12/19/23 0248     WBC 10.75 Thousand/uL      RBC 4.18 Million/uL      Hemoglobin 12.8 g/dL      Hematocrit 38.3 %      MCV 92 fL      MCH 30.6 pg      MCHC 33.4 g/dL      RDW 12.1 %      MPV 13.4 fL      Platelets 147 Thousands/uL      nRBC 0 /100 WBCs      Neutrophils Relative 74 %      Immat GRANS % 1 %      Lymphocytes Relative 19 %      Monocytes Relative 5 %      Eosinophils Relative 1 %      Basophils Relative 0 %      Neutrophils Absolute 7.99 Thousands/µL      Immature Grans Absolute 0.05 Thousand/uL      Lymphocytes Absolute 2.04 Thousands/µL      Monocytes Absolute 0.56 Thousand/µL      Eosinophils Absolute 0.07 Thousand/µL      Basophils Absolute 0.04 Thousands/µL     Comprehensive metabolic panel [[id number]]  (Abnormal) Collected: 12/19/23 0158    Lab Status: Final result Specimen: Blood from Arm, Left Updated: 12/19/23 0238     Sodium 137 mmol/L      Potassium 3.9 mmol/L      Chloride 104 mmol/L      CO2 26 mmol/L      ANION GAP 7 mmol/L      BUN 17 mg/dL      Creatinine 0.78 mg/dL      Glucose 145 mg/dL      Calcium  9.2 mg/dL      AST 19 U/L      ALT 14 U/L      Alkaline Phosphatase 91 U/L      Total Protein 6.9 g/dL      Albumin 4.2 g/dL      Total Bilirubin 0.62 mg/dL      eGFR 87 ml/min/1.73sq m     Narrative:      National Kidney Disease Foundation guidelines for Chronic Kidney Disease (CKD):     Stage 1 with normal or high GFR (GFR > 90 mL/min/1.73 square meters)    Stage 2 Mild CKD (GFR = 60-89 mL/min/1.73 square meters)    Stage 3A Moderate CKD (GFR = 45-59 mL/min/1.73 square meters)    Stage 3B Moderate CKD (GFR = 30-44 mL/min/1.73 square meters)    Stage 4 Severe CKD (GFR = 15-29 mL/min/1.73 square meters)    Stage 5 End Stage CKD (GFR <15 mL/min/1.73 square meters)  Note: GFR calculation is accurate only with a steady state creatinine    Salicylate level [[id number]]  (Normal) Collected: 12/19/23 0158    Lab Status: Final result Specimen: Blood from Arm, Left Updated: 12/19/23 0238     Salicylate Lvl <5 mg/dL     Acetaminophen level-If concentration is detectable, please discuss with medical  on call. [[id number]]  (Abnormal) Collected: 12/19/23 0158    Lab Status: Final result Specimen: Blood from Arm, Left Updated: 12/19/23 0238     Acetaminophen Level <2 ug/mL     HS Troponin 0hr (reflex protocol) [[id number]]  (Normal) Collected: 12/19/23 0158    Lab Status: Final result Specimen: Blood from Arm, Left Updated: 12/19/23 0229     hs TnI 0hr 3 ng/L     Ethanol [[id number]]  (Normal) Collected: 12/19/23 0158    Lab Status: Final result Specimen: Blood from Arm, Left Updated: 12/19/23 0220     Ethanol Lvl <10 mg/dL                    No orders to display              Procedures  Procedures         ED Course  ED Course as of 12/19/23 0541   Tue Dec 19, 2023   6956 Patient notes some improvement currently feels fatigued.  No persistent tremor on repeat examination.  Patient laboratory evaluation without acute findings.  Offered continued observation in the hospital versus close outpatient follow-up and patient  prefers to return home.  Emphasized avoiding repeating this again.  Discussed return precautions in detail.                               SBIRT 20yo+      Flowsheet Row Most Recent Value   Initial Alcohol Screen: US AUDIT-C     1. How often do you have a drink containing alcohol? 0 Filed at: 12/19/2023 0133   2. How many drinks containing alcohol do you have on a typical day you are drinking?  0 Filed at: 12/19/2023 0133   3b. FEMALE Any Age, or MALE 65+: How often do you have 4 or more drinks on one occassion? 0 Filed at: 12/19/2023 0133   Audit-C Score 0 Filed at: 12/19/2023 0133   TON: How many times in the past year have you...    Used an illegal drug or used a prescription medication for non-medical reasons? Never Filed at: 12/19/2023 0133                      Medical Decision Making  Amount and/or Complexity of Data Reviewed  Labs: ordered.    Risk  Prescription drug management.             Disposition  Final diagnoses:   Drug abuse (HCC)     Time reflects when diagnosis was documented in both MDM as applicable and the Disposition within this note       Time User Action Codes Description Comment    12/19/2023  5:09 AM Ulises Michael Add [F19.10] Drug abuse (HCC)           ED Disposition       ED Disposition   Discharge    Condition   Stable    Date/Time   Tue Dec 19, 2023 1109    Comment   Amberly Tadeo discharge to home/self care.                   Follow-up Information       Follow up With Specialties Details Why Contact Info Additional Information    Erwin Augustine PA-C Physician Assistant Schedule an appointment as soon as possible for a visit in 3 days Follow up and reassessment. [address] Replaced by [facility]  [address]  [place] [address] [phone]       Count includes the [facility] Emergency Department Emergency Medicine Go to  If symptoms worsen [address] [facility] [id number] [phone] Count includes the [facility] Emergency Department, [address] [place] [address]  [place], [address]            Discharge Medication List as of 12/19/2023  5:10 AM        CONTINUE these medications which have NOT CHANGED    Details   ALPRAZolam (XANAX) 0.25 mg tablet Take 0.25 mg by mouth daily as needed, Starting Thu 9/3/2020, Until Tue 3/2/2021, Historical Med      amLODIPine (NORVASC) 5 mg tablet Take 5 mg by mouth daily, Starting Thu 9/3/2020, Historical Med             No discharge procedures on file.    PDMP Review       None            ED Provider  Electronically Signed by             Ulises Michael MD  12/19/23 0541